# Patient Record
Sex: FEMALE | Race: WHITE | NOT HISPANIC OR LATINO | Employment: UNEMPLOYED | URBAN - METROPOLITAN AREA
[De-identification: names, ages, dates, MRNs, and addresses within clinical notes are randomized per-mention and may not be internally consistent; named-entity substitution may affect disease eponyms.]

---

## 2025-05-28 ENCOUNTER — APPOINTMENT (OUTPATIENT)
Dept: RADIOLOGY | Facility: HOSPITAL | Age: 40
End: 2025-05-28
Payer: COMMERCIAL

## 2025-05-28 ENCOUNTER — HOSPITAL ENCOUNTER (EMERGENCY)
Facility: HOSPITAL | Age: 40
Discharge: HOME | End: 2025-05-28
Payer: COMMERCIAL

## 2025-05-28 ENCOUNTER — OFFICE VISIT (OUTPATIENT)
Dept: URGENT CARE | Age: 40
End: 2025-05-28
Payer: COMMERCIAL

## 2025-05-28 ENCOUNTER — TELEPHONE (OUTPATIENT)
Dept: HEMATOLOGY/ONCOLOGY | Facility: CLINIC | Age: 40
End: 2025-05-28
Payer: COMMERCIAL

## 2025-05-28 VITALS
WEIGHT: 180 LBS | RESPIRATION RATE: 14 BRPM | OXYGEN SATURATION: 98 % | BODY MASS INDEX: 30.73 KG/M2 | HEART RATE: 86 BPM | SYSTOLIC BLOOD PRESSURE: 130 MMHG | DIASTOLIC BLOOD PRESSURE: 91 MMHG | HEIGHT: 64 IN | TEMPERATURE: 98.4 F

## 2025-05-28 VITALS
SYSTOLIC BLOOD PRESSURE: 122 MMHG | DIASTOLIC BLOOD PRESSURE: 80 MMHG | OXYGEN SATURATION: 98 % | HEART RATE: 101 BPM | TEMPERATURE: 96.3 F

## 2025-05-28 DIAGNOSIS — L08.9 FOOT INFECTION: Primary | ICD-10-CM

## 2025-05-28 DIAGNOSIS — L03.115 CELLULITIS OF RIGHT LOWER EXTREMITY: Primary | ICD-10-CM

## 2025-05-28 LAB
ALBUMIN SERPL BCP-MCNC: 3.8 G/DL (ref 3.4–5)
ALP SERPL-CCNC: 67 U/L (ref 33–110)
ALT SERPL W P-5'-P-CCNC: 25 U/L (ref 7–45)
ANION GAP SERPL CALC-SCNC: 9 MMOL/L (ref 10–20)
AST SERPL W P-5'-P-CCNC: 20 U/L (ref 9–39)
BASOPHILS # BLD AUTO: 0.06 X10*3/UL (ref 0–0.1)
BASOPHILS NFR BLD AUTO: 0.5 %
BILIRUB SERPL-MCNC: 0.3 MG/DL (ref 0–1.2)
BUN SERPL-MCNC: 12 MG/DL (ref 6–23)
CALCIUM SERPL-MCNC: 8.4 MG/DL (ref 8.6–10.3)
CHLORIDE SERPL-SCNC: 103 MMOL/L (ref 98–107)
CO2 SERPL-SCNC: 27 MMOL/L (ref 21–32)
CREAT SERPL-MCNC: 0.71 MG/DL (ref 0.5–1.05)
CRP SERPL-MCNC: 9.98 MG/DL
EGFRCR SERPLBLD CKD-EPI 2021: >90 ML/MIN/1.73M*2
EOSINOPHIL # BLD AUTO: 0.11 X10*3/UL (ref 0–0.7)
EOSINOPHIL NFR BLD AUTO: 1 %
ERYTHROCYTE [DISTWIDTH] IN BLOOD BY AUTOMATED COUNT: 19.2 % (ref 11.5–14.5)
ERYTHROCYTE [SEDIMENTATION RATE] IN BLOOD BY WESTERGREN METHOD: 66 MM/H (ref 0–20)
GLUCOSE SERPL-MCNC: 95 MG/DL (ref 74–99)
HCT VFR BLD AUTO: 33.4 % (ref 36–46)
HGB BLD-MCNC: 9.5 G/DL (ref 12–16)
IMM GRANULOCYTES # BLD AUTO: 0.03 X10*3/UL (ref 0–0.7)
IMM GRANULOCYTES NFR BLD AUTO: 0.3 % (ref 0–0.9)
LACTATE SERPL-SCNC: 0.7 MMOL/L (ref 0.4–2)
LYMPHOCYTES # BLD AUTO: 1.64 X10*3/UL (ref 1.2–4.8)
LYMPHOCYTES NFR BLD AUTO: 14.9 %
MCH RBC QN AUTO: 20.6 PG (ref 26–34)
MCHC RBC AUTO-ENTMCNC: 28.4 G/DL (ref 32–36)
MCV RBC AUTO: 73 FL (ref 80–100)
MONOCYTES # BLD AUTO: 0.93 X10*3/UL (ref 0.1–1)
MONOCYTES NFR BLD AUTO: 8.4 %
NEUTROPHILS # BLD AUTO: 8.24 X10*3/UL (ref 1.2–7.7)
NEUTROPHILS NFR BLD AUTO: 74.9 %
NRBC BLD-RTO: 0 /100 WBCS (ref 0–0)
PLATELET # BLD AUTO: 340 X10*3/UL (ref 150–450)
POTASSIUM SERPL-SCNC: 3.3 MMOL/L (ref 3.5–5.3)
PROT SERPL-MCNC: 6.8 G/DL (ref 6.4–8.2)
RBC # BLD AUTO: 4.61 X10*6/UL (ref 4–5.2)
SODIUM SERPL-SCNC: 136 MMOL/L (ref 136–145)
WBC # BLD AUTO: 11 X10*3/UL (ref 4.4–11.3)

## 2025-05-28 PROCEDURE — 96366 THER/PROPH/DIAG IV INF ADDON: CPT

## 2025-05-28 PROCEDURE — 1036F TOBACCO NON-USER: CPT

## 2025-05-28 PROCEDURE — 36415 COLL VENOUS BLD VENIPUNCTURE: CPT | Performed by: NURSE PRACTITIONER

## 2025-05-28 PROCEDURE — 93971 EXTREMITY STUDY: CPT

## 2025-05-28 PROCEDURE — 85652 RBC SED RATE AUTOMATED: CPT | Performed by: NURSE PRACTITIONER

## 2025-05-28 PROCEDURE — 87075 CULTR BACTERIA EXCEPT BLOOD: CPT | Mod: PORLAB | Performed by: NURSE PRACTITIONER

## 2025-05-28 PROCEDURE — 99203 OFFICE O/P NEW LOW 30 MIN: CPT

## 2025-05-28 PROCEDURE — 2500000005 HC RX 250 GENERAL PHARMACY W/O HCPCS: Performed by: NURSE PRACTITIONER

## 2025-05-28 PROCEDURE — 96365 THER/PROPH/DIAG IV INF INIT: CPT

## 2025-05-28 PROCEDURE — 83605 ASSAY OF LACTIC ACID: CPT | Performed by: NURSE PRACTITIONER

## 2025-05-28 PROCEDURE — 84075 ASSAY ALKALINE PHOSPHATASE: CPT | Performed by: NURSE PRACTITIONER

## 2025-05-28 PROCEDURE — 86140 C-REACTIVE PROTEIN: CPT | Performed by: NURSE PRACTITIONER

## 2025-05-28 PROCEDURE — 2500000004 HC RX 250 GENERAL PHARMACY W/ HCPCS (ALT 636 FOR OP/ED): Performed by: NURSE PRACTITIONER

## 2025-05-28 PROCEDURE — 99284 EMERGENCY DEPT VISIT MOD MDM: CPT | Mod: 25,27

## 2025-05-28 PROCEDURE — 96375 TX/PRO/DX INJ NEW DRUG ADDON: CPT

## 2025-05-28 PROCEDURE — 96367 TX/PROPH/DG ADDL SEQ IV INF: CPT

## 2025-05-28 PROCEDURE — 93971 EXTREMITY STUDY: CPT | Performed by: RADIOLOGY

## 2025-05-28 PROCEDURE — 85025 COMPLETE CBC W/AUTO DIFF WBC: CPT | Performed by: NURSE PRACTITIONER

## 2025-05-28 RX ORDER — METOCLOPRAMIDE HYDROCHLORIDE 5 MG/ML
10 INJECTION INTRAMUSCULAR; INTRAVENOUS ONCE
Status: COMPLETED | OUTPATIENT
Start: 2025-05-28 | End: 2025-05-28

## 2025-05-28 RX ORDER — CEPHALEXIN 500 MG/1
500 CAPSULE ORAL 4 TIMES DAILY
Qty: 40 CAPSULE | Refills: 0 | Status: SHIPPED | OUTPATIENT
Start: 2025-05-28 | End: 2025-06-11

## 2025-05-28 RX ORDER — VANCOMYCIN HYDROCHLORIDE 1 G/20ML
INJECTION, POWDER, LYOPHILIZED, FOR SOLUTION INTRAVENOUS DAILY PRN
Status: DISCONTINUED | OUTPATIENT
Start: 2025-05-28 | End: 2025-05-28 | Stop reason: HOSPADM

## 2025-05-28 RX ORDER — SULFAMETHOXAZOLE AND TRIMETHOPRIM 800; 160 MG/1; MG/1
1 TABLET ORAL 2 TIMES DAILY
Qty: 20 TABLET | Refills: 0 | Status: SHIPPED | OUTPATIENT
Start: 2025-05-28 | End: 2025-06-11

## 2025-05-28 RX ADMIN — METOCLOPRAMIDE 10 MG: 5 INJECTION, SOLUTION INTRAMUSCULAR; INTRAVENOUS at 12:25

## 2025-05-28 RX ADMIN — VANCOMYCIN HYDROCHLORIDE 2000 MG: 10 INJECTION, POWDER, LYOPHILIZED, FOR SOLUTION INTRAVENOUS at 12:25

## 2025-05-28 RX ADMIN — PIPERACILLIN SODIUM AND TAZOBACTAM SODIUM 3.38 G: 3; .375 INJECTION, SOLUTION INTRAVENOUS at 11:55

## 2025-05-28 ASSESSMENT — LIFESTYLE VARIABLES
EVER HAD A DRINK FIRST THING IN THE MORNING TO STEADY YOUR NERVES TO GET RID OF A HANGOVER: NO
HAVE YOU EVER FELT YOU SHOULD CUT DOWN ON YOUR DRINKING: NO
TOTAL SCORE: 0
HAVE PEOPLE ANNOYED YOU BY CRITICIZING YOUR DRINKING: NO
EVER FELT BAD OR GUILTY ABOUT YOUR DRINKING: NO

## 2025-05-28 ASSESSMENT — PAIN - FUNCTIONAL ASSESSMENT: PAIN_FUNCTIONAL_ASSESSMENT: 0-10

## 2025-05-28 ASSESSMENT — PAIN SCALES - GENERAL: PAINLEVEL_OUTOF10: 10 - WORST POSSIBLE PAIN

## 2025-05-28 ASSESSMENT — COLUMBIA-SUICIDE SEVERITY RATING SCALE - C-SSRS
1. IN THE PAST MONTH, HAVE YOU WISHED YOU WERE DEAD OR WISHED YOU COULD GO TO SLEEP AND NOT WAKE UP?: NO
2. HAVE YOU ACTUALLY HAD ANY THOUGHTS OF KILLING YOURSELF?: NO
6. HAVE YOU EVER DONE ANYTHING, STARTED TO DO ANYTHING, OR PREPARED TO DO ANYTHING TO END YOUR LIFE?: NO

## 2025-05-28 NOTE — TELEPHONE ENCOUNTER
Attempted to call patient, her only phone number listed is disconnected. Called patient's mother (emergency contact). Left VM on secure VM explaining that I am calling from [facility] trying to schedule an appt for her daughter but need updated contact info for her. [facility] phone number provided.

## 2025-05-28 NOTE — DISCHARGE INSTRUCTIONS
The [facility] will be contacting you for follow-up regarding your right knee mass.

## 2025-05-28 NOTE — PROGRESS NOTES
Subjective   Patient ID: Sayra Chau is a 40 y.o. female. They present today with a chief complaint of poss. pinky toe bite, knot behing knee (Rt foot is swollen, red, painful, different bites appearing. Pt is also complaining of a knot behind knee with pain).    History of Present Illness  Sridevi Chau is a 40 y.o. female. They present today with a chief complaint of poss. pinky toe bite, knot behing knee (Rt foot is swollen, red, painful, different bites appearing. Pt is also complaining of a knot behind knee with pain).  She states that she may have gotten bit on her right foot.  She noticed immediate swelling, body aches fevers and chills over the last 48 hours.  She states that her right lower leg feels warm and that she feels a knot in the back of her thigh.  She is here for evaluation.    Past Medical History  Allergies as of 05/28/2025 - Reviewed 05/28/2025   Allergen Reaction Noted    Penicillin g Hives 12/02/2020    Zofran odt [ondansetron] Nausea/vomiting 05/28/2025       Prescriptions Prior to Admission[1]     Medical History[2]    Surgical History[3]     reports that she has never smoked. She has never used smokeless tobacco.    Review of Systems  Review of Systems  Joint swelling, myalgia, arthralgia, erythema, bruising, fevers, chills      Objective    Vitals:    05/28/25 1023   BP: 122/80   Pulse: 101   Temp: 35.7 °C (96.3 °F)   SpO2: 98%     No LMP recorded.    Physical Exam  Musculoskeletal:        Feet:    Feet:      Comments: Erythema, swelling, bruising        Procedures    Point of Care Test & Imaging Results from this visit  No results found for this visit on 05/28/25.   Imaging  No results found.    Cardiology, Vascular, and Other Imaging  No other imaging results found for the past 2 days      Diagnostic study results (if any) were reviewed by KATARZYNA Rodriguez-KEITH.    Assessment/Plan   Allergies, medications, history, and pertinent labs/EKGs/Imaging reviewed by Harry Castaneda,  APRN-CNP.     Medical Decision Making  Right foot evaluation does reveal generalized swelling, erythema, warmth and tenderness that is starting to extend up her foot.  Given the purported events of a possible spider bite, fevers, body aches and chills, recommend the patient heads to [facility] emergency department for blood cultures, lab work, IV antibiotics.  Given the evaluation, I do not believe p.o. antibiotics are applicable at this time.    As a result of the work-up, the patient was discharged home.  she was informed of her diagnosis and instructed to come back with any concerns or worsening of condition.  she and was agreeable to the plan as discussed above.  she was given the opportunity to ask questions.  All of the patient's questions were answered.    This document was generated using the assistance of voice recognition software. If there are any errors of spelling, grammar, syntax, or meaning; please feel free to contact me directly for clarification.     Orders and Diagnoses  Diagnoses and all orders for this visit:  Foot infection      Medical Admin Record      Patient disposition: ED    Electronically signed by AUDELIA Rodriguez  10:41 AM           [1] (Not in a hospital admission)   [2] History reviewed. No pertinent past medical history.  [3] History reviewed. No pertinent surgical history.

## 2025-05-28 NOTE — TELEPHONE ENCOUNTER
Referral placed to [facility] by Anne Clarke NP, [facility], for right popliteal mass vs LN, discovered incidentally on RLE duplex imaging today. Advise VV for evaluation & work-up. Thanks,  KATARZYNA Laughlin.CNP  [facility]

## 2025-05-28 NOTE — ED PROVIDER NOTES
"    HPI   Chief Complaint   Patient presents with    Insect Bite     Pt felt something bite her right pinky toe on Sunday morning, pt noticed her foot started swelling yesterday, today pt foot is red and swollen and can't  put pressure on it. Pedal pulse +2, cap refill less then 2 sec, pt reports no sensory loss and pt has full range of motion       This is a 40-year-old  female that is presenting to the emergency room with concerns for an insect bite to the right foot.  The patient states that she is staying at a hotel and believes that a spider bit her right fifth toe 4 days ago.  The patient states that she noticed that it started swelling and turning red yesterday and is now not able to put weight onto it.  The patient states that she has been having mild cold-like symptoms.  She is concerned that the poison is traveling up to her heart.  She is not having any chest pain, shortness of breath, dizziness, palpitations, paresthesias, focal weakness.  Patient is not experiencing any abdominal pain or alteration in her urine or bowel function.  Denies any fever.  She states she has been mildly nauseous without any vomiting.  The patient also states that she had swelling and pain in the posterior aspect of the right knee. The patient denies any history of pulmonary embolism or DVT.  Patient is not experiencing any calf swelling or pain.  Denies any recent travel, hospitalizations, cancer, or surgery.  The patient was seen at the urgent care and referred to the emergency room for further evaluation.      History provided by:  Patient   used: No                          Edwards Coma Scale Score: 15                  Patient History   Medical History[1]  Surgical History[2]  Family History[3]  Social History[4]    Physical Exam   Visit Vitals  /84   Pulse (!) 113   Temp 36.9 °C (98.4 °F)   Resp 20   Ht 1.626 m (5' 4\")   Wt 81.6 kg (180 lb)   SpO2 99%   BMI 30.90 kg/m²   Smoking Status " Never   BSA 1.92 m²      Physical Exam    Lower extremity venous duplex right    (Results Pending)       Labs Reviewed   CBC WITH AUTO DIFFERENTIAL - Abnormal       Result Value    WBC 11.0      nRBC 0.0      RBC 4.61      Hemoglobin 9.5 (*)     Hematocrit 33.4 (*)     MCV 73 (*)     MCH 20.6 (*)     MCHC 28.4 (*)     RDW 19.2 (*)     Platelets 340      Neutrophils % 74.9      Immature Granulocytes %, Automated 0.3      Lymphocytes % 14.9      Monocytes % 8.4      Eosinophils % 1.0      Basophils % 0.5      Neutrophils Absolute 8.24 (*)     Immature Granulocytes Absolute, Automated 0.03      Lymphocytes Absolute 1.64      Monocytes Absolute 0.93      Eosinophils Absolute 0.11      Basophils Absolute 0.06     BLOOD CULTURE   BLOOD CULTURE   COMPREHENSIVE METABOLIC PANEL   LACTATE   C-REACTIVE PROTEIN   SEDIMENTATION RATE, AUTOMATED         ED Course & MDM   Diagnoses as of 05/28/25 1516   Cellulitis of right lower extremity           Medical Decision Making  The patient was seen and evaluated by the nurse practitioner, Anne Clarke.  The patient is presenting to the emergency room with complaints of a possible insect bite to the right foot.  She is also has complaints of a lump to the posterior aspect of her right knee.  Differential diagnosis includes cellulitis, osteomyelitis, DVT, Baker's cyst, mass, or other acute process.  Patient was placed on cardiac monitor and continuous pulse oximetry.  A saline lock was established and laboratory studies were drawn with results as noted.  2 sets of blood cultures were obtained and results are pending at the time of dictation the patient was administered vancomycin and Zosyn for antibiotic coverage.  The patient had complaints of nausea and was further medicated with Reglan 10 mg IVP.  The patient was able to tolerate fluids after medication administration.  Laboratory studies revealed that the patient had no evidence of a leukocytosis or lactic acidosis.  The patient did  have elevated inflammatory markers with a sed rate of 66 and a C-reactive protein of 9.98.  An ultrasound of the right lower extremity showed no evidence of venous thrombosis however there was a soft tissue mass with internal vascularity in the popliteal fossa.  The patient was notified of the imaging and laboratory results.  We believe that the patient most likely has cellulitis to the lower extremity.  He has no evidence of an insect bite.  No induration or fluctuance appreciated.  No lymphangitic streaking.  The patient will be provided prescriptions for Keflex and Bactrim for home administration.  She was referred to the [facility] for further evaluation of the popliteal mass.  The patient is to follow up with their primary care physician in the next 2-3 days.  The patient is to return to the ED worse in any way.  The patient was discharged in stable condition with computer discharge instructions given. Patient was agreeable with discharge planning.           Your medication list      You have not been prescribed any medications.         Procedure       *This report was transcribed using voice recognition software.  Every effort was made to ensure accuracy; however, inadvertent computerized transcription errors may be present.*  Anne COLÓN-CNP  05/28/25           [1] History reviewed. No pertinent past medical history.  [2] History reviewed. No pertinent surgical history.  [3] No family history on file.  [4]   Social History  Tobacco Use    Smoking status: Never    Smokeless tobacco: Never   Substance Use Topics    Alcohol use: Not on file    Drug use: Not on file        KATARZYNA Smart-KEITH  05/28/25 5115

## 2025-06-01 LAB
BACTERIA BLD CULT: NORMAL
BACTERIA BLD CULT: NORMAL

## 2025-06-02 ENCOUNTER — PATIENT OUTREACH (OUTPATIENT)
Dept: HEMATOLOGY/ONCOLOGY | Facility: HOSPITAL | Age: 40
End: 2025-06-02
Payer: COMMERCIAL

## 2025-06-02 NOTE — PROGRESS NOTES
Patient is scheduled for a new patient visit with Dr. Aida Hernandez on Wednesday 6/4/2025 at 10am.   Referred by AUDELIA Smart and AUDELIA Sanchez for Lymphocytosis and new right popliteal mass/cellulitis.     Patient had a BLE US completed on 5/28/2025, results are visible in EPIC.     Called and spoke to patient. Informed patient where to go, parking location, check-in process, and answered all other new patient visit questions that the patient had at this time.     Patient states that the mass on her leg is painful, burns when she eats certain foods, has had yellow-colored discharge from mass, and symptoms have been getting worse over the last couple months.     Confirmed that she will be at her appointment on 6/4/2025.   Call back number provided if needed or if patient has any further questions before her appointment.

## 2025-06-04 ENCOUNTER — OFFICE VISIT (OUTPATIENT)
Dept: HEMATOLOGY/ONCOLOGY | Facility: HOSPITAL | Age: 40
End: 2025-06-04
Payer: COMMERCIAL

## 2025-06-04 VITALS
SYSTOLIC BLOOD PRESSURE: 140 MMHG | RESPIRATION RATE: 14 BRPM | TEMPERATURE: 97 F | WEIGHT: 221.6 LBS | HEART RATE: 101 BPM | BODY MASS INDEX: 38.04 KG/M2 | OXYGEN SATURATION: 99 % | DIASTOLIC BLOOD PRESSURE: 81 MMHG

## 2025-06-04 DIAGNOSIS — L03.115 CELLULITIS OF RIGHT LOWER EXTREMITY: Primary | ICD-10-CM

## 2025-06-04 DIAGNOSIS — D64.9 ANEMIA, UNSPECIFIED TYPE: ICD-10-CM

## 2025-06-04 DIAGNOSIS — R22.41 MASS OF RIGHT LOWER EXTREMITY: ICD-10-CM

## 2025-06-04 DIAGNOSIS — F17.200 TOBACCO USE DISORDER: ICD-10-CM

## 2025-06-04 PROCEDURE — 1036F TOBACCO NON-USER: CPT | Performed by: NURSE PRACTITIONER

## 2025-06-04 PROCEDURE — 99205 OFFICE O/P NEW HI 60 MIN: CPT | Performed by: NURSE PRACTITIONER

## 2025-06-04 PROCEDURE — 99215 OFFICE O/P EST HI 40 MIN: CPT | Performed by: NURSE PRACTITIONER

## 2025-06-04 RX ORDER — DOXYCYCLINE 100 MG/1
100 TABLET ORAL 2 TIMES DAILY
Qty: 20 TABLET | Refills: 0 | Status: SHIPPED | OUTPATIENT
Start: 2025-06-04 | End: 2025-06-12 | Stop reason: WASHOUT

## 2025-06-04 ASSESSMENT — PAIN SCALES - GENERAL: PAINLEVEL_OUTOF10: 10-WORST PAIN EVER

## 2025-06-04 NOTE — PATIENT INSTRUCTIONS
Plan:    (1). Antibiotics for your foot infection: Doxycycline one pill by mouth twice daily with food. Complete the whole prescription.    (2). Rest and elevate your foot up on pillows throughout the day. Apply heating pad on low or warm compress to your leg. Use Ibuprofen as needed to help with inflammation and discomfort. Follow dosing on bottle and take with food.    (3). Referral to wound clinic for your foot.    (4). Referral to primary care to get established with a doctor - for follow-up of your cellulitis, evaluation of your anemia, and other symptoms and routine health maintenance.    (5). Recheck ultrasound of your right leg in about a month, once cellulitis is improving.    Call us with any questions or issues.  [phone]  Jocelyne Zepeda, APRN-CNP

## 2025-06-04 NOTE — PROGRESS NOTES
Patient arrived to visit today but is being seen my diagnostic clinic instead as a new patient. Jocelyne Zepeda NP is assuming care at this time.     NO further needs from Dr. Hernandez's team at this time.

## 2025-06-04 NOTE — PROGRESS NOTES
"[facility]  Diagnostic Clinic  New Patient Visit    Date of Service: 25      Patient Name: Sayra Chau   : 1985  MRN: [id number]   PCP: No primary care provider on file.   Referred by: KATARZYNA Sousa    Problem: popliteal mass    HPI: Sayra Chau is a 40 y.o. year old female w/ PMH nephrolithiasis, GERD, tobacco use disorder, neuropathy, who presents to [facility] with right popliteal mass, referral placed by Anne Clarke NP, [facility] ED.     Ms. Chau initially presented to [place] ED on 25 with concern for RLE cellulitis. She noted an insect bite on her 5th toe a few days prior, which led to worsening redness, swelling, and discomfort of her RLE. She underwent RLE duplex US - which was negative for DVT but incidentally revealed a hypoechoic mass with internal vascularity measuring 14 x 13 x 13 mm of the right popliteal fossa, possibly a lymph node. She was discharged home with courses of Keflex & Bactrim for RLE cellulitis & advised to follow-up with the diagnostic clinic for evaluation of the popliteal mass.    Ms. Chau presents to clinic today & reports only mild improvement in her right foot redness, swelling, and discomfort. States she discontinued both antibiotics after ~3 days due to feeling poorly and flushed. She reports her RLE symptoms started approx 10 days ago, after noticing an insect bite her right fifth toe. Redness spread up her foot to ankle, now notes mild peeling over foot and mild swelling. She has baseline neuropathy and describes paresthesias of bilateral feet. Endorses fevers/chills and vomiting at outset of symptoms, now resolved. Also states she noted a \"lump\" behind her right knee with onset of cellulitis 10 days ago, describes as tender, possibly decreasing over past few days.     History of tobacco use:   Current smoker, started age 23, 1 ppd.  17 pack yr history.    Personal history of malignancy:   None.    PATHOLOGY:  N/A   "   IMAGING:    === 05/28/25 ===    RLE DUPLEX ULTRASOUND    FINDINGS:  THIGH VEINS:  The common femoral, femoral, popliteal, proximal medial  saphenous, and deep femoral veins are patent and free of thrombus.  The veins are normally compressible.  They demonstrate normal phasic  flow and augmentation response.      CALF VEINS:  The paired peroneal and posterior tibial calf veins are  patent.      In the lateral aspect of the popliteal fossa there is a hypoechoic  mass with internal vascularity measuring 14 x 13 x 13 mm. Neoplastic  lesion is a consideration possibly an enlarged popliteal lymph node.  No fat hilum is seen. Of note, there are few enlarged right inguinal  lymph nodes however they have a more benign morphology with central  fatty judy.      IMPRESSION:  Negative study.  No deep venous thrombosis of the  right lower  extremity.      Small soft tissue mass with internal vascularity in the popliteal  fossa as detailed above.      MACRO:  None      Signed by: Joseph Schoenberger 5/28/2025 1:36 PM  Dictation workstation:   [id number]      LABS:  5/28/25 ED lab work reviewed, anemia noted, present for >2 years, no prior work-up. Inflammatory markers (ESR, CRP) elevated. BCs negative.    PAST MEDICAL HISTORY:  Medical History[1]    PAST SURGICAL HISTORY:  Surgical History[2]    SOCIAL HISTORY:  Social Connections: Not on file       FAMILY HISTORY:  Family History[3]    MEDICATIONS:  Medications Ordered Prior to Encounter[4]      REVIEW OF SYSTEMS:  Review of Systems   Constitutional:  Positive for chills, fever and unexpected weight change.   HENT:   Negative for trouble swallowing.    Respiratory:  Negative for cough.    Gastrointestinal:  Positive for nausea. Negative for vomiting.   Endocrine:        Denies night sweats   Musculoskeletal:  Positive for arthralgias.        +chronic LUE pain, radiating from wrist up to shoulder; +right foot pain x 10 days   Skin:  Positive for wound.        +10 day course of  right foot redness, swelling, warmth, see HPI   Neurological:         +neuropathy x bilat feet, +chronic pain, unable to work dt pain   Psychiatric/Behavioral:  The patient is nervous/anxious.         +surrounding current health issue         OBJECTIVE:  /81 (BP Location: Right arm, Patient Position: Sitting, BP Cuff Size: Large adult)   Pulse 101   Temp 36.1 °C (97 °F) (Skin)   Resp 14   Wt 101 kg (221 lb 9.6 oz) Comment: notfied nurse about weight did pt wieght twice  SpO2 99%   BMI 38.04 kg/m²   Physical Exam  Constitutional:       General: She is not in acute distress.     Appearance: Normal appearance. She is not toxic-appearing.      Comments: Tearful at times   HENT:      Head: Normocephalic.      Right Ear: External ear normal.      Left Ear: External ear normal.      Nose: Nose normal.      Mouth/Throat:      Mouth: Mucous membranes are moist.      Pharynx: Oropharynx is clear. No oropharyngeal exudate or posterior oropharyngeal erythema.   Eyes:      Extraocular Movements: Extraocular movements intact.      Conjunctiva/sclera: Conjunctivae normal.   Cardiovascular:      Rate and Rhythm: Normal rate and regular rhythm.      Pulses: Normal pulses.      Heart sounds: Normal heart sounds. No murmur heard.     No gallop.   Pulmonary:      Effort: No respiratory distress.      Breath sounds: Normal breath sounds. No wheezing or rales.   Abdominal:      General: Bowel sounds are normal.      Palpations: Abdomen is soft.      Tenderness: There is no abdominal tenderness. There is no guarding.   Musculoskeletal:      Cervical back: Neck supple.      Right lower leg: Edema present.      Left lower leg: No edema.      Comments: 1+ right pedal edema   Lymphadenopathy:      Cervical: No cervical adenopathy.   Skin:     Findings: Erythema present.      Comments: +right foot mildly erythematous diffusely to ankle, desquamating, mild swelling, pedal pulse intact, full ROM.   Neurological:      General: No  focal deficit present.      Mental Status: She is alert and oriented to person, place, and time.       ASSESSMENT:  Sayra Chau is a 40 y.o. year old female w/ PMH nephrolithiasis, GERD, tobacco use disorder, neuropathy, who presents to [facility] with right popliteal mass of uncertain etiology.    PLAN:  1. Cellulitis of right lower extremity (Primary)  2. Mass of right lower extremity  3. Tobacco use disorder  4. Anemia  Uncertain etiology of popliteal mass, favor enlarged lymph node in setting of RLE cellulitis.  -- Antibiotics discontinued by patient & unwilling to trial again. Doxycycline Rx sent to her preferred pharmacy, states she has tolerated in past, advised to take entire course.   -- Encourage elevation of RLE as possible, anti-inflammatory PRN.  -- Advised appt to establish w/ PCP for re-evaluation of RLE cellulitis. Referral placed; we will assist w/ making an appt today.  -- Also discussed pt will need anemia work-up w/ PCP, as persistent over >2 yrs. Also has chronic abd pain, nausea, diarrhea, so will need PCP to coordinate gastro referral.  -- RLE ultrasound ordered to re-evaluate popliteal mass in 1 month, following treatment of cellulitis.  -- Strongly encourage tobacco cessation.  -- Follow-up pending results of above; Diagnostic Clinic to follow results & contact patient for review.  -- All patient questions answered. I provided the patient w/ the contact information for the diagnostic clinic; advised her to call in the interim with any questions/issues.     KATARZYNA Laughlin.CNP  [facility]                  [1]  History reviewed. No pertinent past medical history.  [2]  History reviewed. No pertinent surgical history.  [3]  No family history on file.  [4]  Current Outpatient Medications on File Prior to Visit   Medication Sig Dispense Refill   • cephalexin (Keflex) 500 mg capsule Take 1 capsule (500 mg) by mouth 4 times a day for 10 days. 40 capsule 0   •  sulfamethoxazole-trimethoprim (Bactrim DS) 800-160 mg tablet Take 1 tablet by mouth 2 times a day for 10 days. 20 tablet 0     No current facility-administered medications on file prior to visit.

## 2025-06-11 ASSESSMENT — ENCOUNTER SYMPTOMS
ENDOCRINE COMMENTS: DENIES NIGHT SWEATS
COUGH: 0
TROUBLE SWALLOWING: 0
ARTHRALGIAS: 1
NAUSEA: 1
WOUND: 1
VOMITING: 0
CHILLS: 1
NERVOUS/ANXIOUS: 1
FEVER: 1
UNEXPECTED WEIGHT CHANGE: 1

## 2025-06-12 ENCOUNTER — APPOINTMENT (OUTPATIENT)
Dept: PRIMARY CARE | Facility: CLINIC | Age: 40
End: 2025-06-12
Payer: COMMERCIAL

## 2025-06-12 VITALS
TEMPERATURE: 97.4 F | SYSTOLIC BLOOD PRESSURE: 99 MMHG | DIASTOLIC BLOOD PRESSURE: 67 MMHG | WEIGHT: 221 LBS | HEIGHT: 64 IN | BODY MASS INDEX: 37.73 KG/M2 | OXYGEN SATURATION: 99 % | HEART RATE: 65 BPM | RESPIRATION RATE: 18 BRPM

## 2025-06-12 DIAGNOSIS — R19.7 DIARRHEA, UNSPECIFIED TYPE: ICD-10-CM

## 2025-06-12 DIAGNOSIS — R20.0 NUMBNESS AND TINGLING OF BOTH FEET: ICD-10-CM

## 2025-06-12 DIAGNOSIS — G89.29 CHRONIC PAIN OF BOTH WRISTS: ICD-10-CM

## 2025-06-12 DIAGNOSIS — L03.115 CELLULITIS OF RIGHT LOWER EXTREMITY: ICD-10-CM

## 2025-06-12 DIAGNOSIS — N92.6 IRREGULAR MENSTRUATION, UNSPECIFIED: ICD-10-CM

## 2025-06-12 DIAGNOSIS — M25.531 CHRONIC PAIN OF BOTH WRISTS: ICD-10-CM

## 2025-06-12 DIAGNOSIS — Z87.898 HISTORY OF NUMBNESS: ICD-10-CM

## 2025-06-12 DIAGNOSIS — R10.84 GENERALIZED ABDOMINAL PAIN: ICD-10-CM

## 2025-06-12 DIAGNOSIS — M25.532 CHRONIC PAIN OF BOTH WRISTS: ICD-10-CM

## 2025-06-12 DIAGNOSIS — D64.9 ANEMIA, UNSPECIFIED TYPE: ICD-10-CM

## 2025-06-12 DIAGNOSIS — R20.2 NUMBNESS AND TINGLING OF BOTH FEET: ICD-10-CM

## 2025-06-12 DIAGNOSIS — Z76.89 ENCOUNTER TO ESTABLISH CARE WITH NEW DOCTOR: Primary | ICD-10-CM

## 2025-06-12 PROBLEM — E66.9 OBESITY: Status: ACTIVE | Noted: 2025-06-12

## 2025-06-12 PROBLEM — K02.9 PAIN DUE TO DENTAL CARIES: Status: ACTIVE | Noted: 2020-12-02

## 2025-06-12 PROBLEM — O28.3 ABNORMAL ULTRASONIC FINDING ON ANTENATAL SCREENING OF MOTHER: Status: ACTIVE | Noted: 2020-11-25

## 2025-06-12 PROCEDURE — 99204 OFFICE O/P NEW MOD 45 MIN: CPT | Performed by: STUDENT IN AN ORGANIZED HEALTH CARE EDUCATION/TRAINING PROGRAM

## 2025-06-12 PROCEDURE — 4004F PT TOBACCO SCREEN RCVD TLK: CPT | Performed by: STUDENT IN AN ORGANIZED HEALTH CARE EDUCATION/TRAINING PROGRAM

## 2025-06-12 PROCEDURE — 3008F BODY MASS INDEX DOCD: CPT | Performed by: STUDENT IN AN ORGANIZED HEALTH CARE EDUCATION/TRAINING PROGRAM

## 2025-06-12 SDOH — ECONOMIC STABILITY: FOOD INSECURITY: WITHIN THE PAST 12 MONTHS, THE FOOD YOU BOUGHT JUST DIDN'T LAST AND YOU DIDN'T HAVE MONEY TO GET MORE.: NEVER TRUE

## 2025-06-12 SDOH — ECONOMIC STABILITY: FOOD INSECURITY: WITHIN THE PAST 12 MONTHS, YOU WORRIED THAT YOUR FOOD WOULD RUN OUT BEFORE YOU GOT MONEY TO BUY MORE.: NEVER TRUE

## 2025-06-12 ASSESSMENT — ENCOUNTER SYMPTOMS
DIARRHEA: 0
CONFUSION: 0
FEVER: 0
VOMITING: 0
SHORTNESS OF BREATH: 0
HEADACHES: 0
COUGH: 0
NAUSEA: 0
ARTHRALGIAS: 1
UNEXPECTED WEIGHT CHANGE: 0
NUMBNESS: 1
CHILLS: 0
COLOR CHANGE: 0
DIZZINESS: 0
ABDOMINAL PAIN: 0
WHEEZING: 0
FATIGUE: 1
CONSTIPATION: 0
PALPITATIONS: 0

## 2025-06-12 ASSESSMENT — PATIENT HEALTH QUESTIONNAIRE - PHQ9
1. LITTLE INTEREST OR PLEASURE IN DOING THINGS: NOT AT ALL
2. FEELING DOWN, DEPRESSED OR HOPELESS: NOT AT ALL
SUM OF ALL RESPONSES TO PHQ9 QUESTIONS 1 & 2: 0

## 2025-06-12 ASSESSMENT — LIFESTYLE VARIABLES
HOW OFTEN DO YOU HAVE A DRINK CONTAINING ALCOHOL: NEVER
HOW MANY STANDARD DRINKS CONTAINING ALCOHOL DO YOU HAVE ON A TYPICAL DAY: PATIENT DOES NOT DRINK
HOW OFTEN DO YOU HAVE SIX OR MORE DRINKS ON ONE OCCASION: NEVER
AUDIT-C TOTAL SCORE: 0
SKIP TO QUESTIONS 9-10: 1

## 2025-06-12 NOTE — PROGRESS NOTES
Subjective   Patient ID: Sayra Chau is a 40 y.o. female who presents for New Patient Visit (Lump behind patient's knee, along with neuropathy in her feet).    HPI   She is a new pt here to [facility] care and also with few concerns. Reports having L wrist pain x 5-6 yrs and its getting worse; reports pain radiate to her shoulder. Also reports some R wrist pain with rain. Denies fall/trauma, swelling or bruise.   Also reports numbness & tingling sensation on her bl feet x for long time; worse after being on feet for longer time; also reports LE swelling, R > L leg. Also reports R knee pain along with lump on the posterior knee; lump intermittently painful. Also feels some pressure on the back of the knee. She is already following with heme/onc for posterior lump and is going for repeat US in a mo per chart.   Also reports intermittent nausea, abd pain/bloating and loose stool x last few yrs. Never saw gastro team before. Also smokes 1/2 ppd x 12-15 yrs.   Has anemia with recent Hgb 9.5 (005/28/25) and has been same in the last 2 yrs; pt reports intermittent heavy and painful period. She tried depo in the past, stopped as she was gaining too much wt on it. She was seeing GYN but he moved to diff location, not following with any at the moment. Reports she is not sex active in the last 3 yrs.   Reports she used to see psych in the past and was on multiple meds; per pt was told she had h/o anxiety, manic depression; reports trying diff meds as lithium, seroquel, trazodone and others w/o much help.     Review of Systems   Constitutional:  Positive for fatigue. Negative for chills, fever and unexpected weight change.   HENT: Negative.     Respiratory:  Negative for cough, shortness of breath and wheezing.    Cardiovascular:  Negative for chest pain, palpitations and leg swelling.   Gastrointestinal:  Negative for abdominal pain, constipation, diarrhea, nausea and vomiting.   Musculoskeletal:  Positive for arthralgias.   Skin:   "Negative for color change and rash.   Neurological:  Positive for numbness. Negative for dizziness and headaches.   Psychiatric/Behavioral:  Negative for behavioral problems and confusion.        Objective   BP 99/67 (BP Location: Left arm, Patient Position: Sitting, BP Cuff Size: Adult)   Pulse 65   Temp 36.3 °C (97.4 °F) (Temporal)   Resp 18   Ht 1.626 m (5' 4\")   Wt 100 kg (221 lb)   SpO2 99%   BMI 37.93 kg/m²     Physical Exam  Vitals and nursing note reviewed.   Constitutional:       Appearance: Normal appearance. She is obese.   Cardiovascular:      Rate and Rhythm: Normal rate and regular rhythm.      Pulses: Normal pulses.      Heart sounds: Normal heart sounds.   Pulmonary:      Effort: Pulmonary effort is normal.      Breath sounds: Normal breath sounds.   Abdominal:      General: Abdomen is flat. Bowel sounds are normal.      Palpations: Abdomen is soft.      Tenderness: There is no abdominal tenderness. There is no guarding or rebound.      Hernia: No hernia is present.      Comments: Mild ttp across the abd.    Musculoskeletal:         General: Normal range of motion.   Neurological:      General: No focal deficit present.      Mental Status: She is alert.   Psychiatric:         Mood and Affect: Mood normal.         Behavior: Behavior normal.       Assessment/Plan   She is a new pt here to [facility] care and also with few concerns.   Appears having L wrist pain for long time with worsening of sx recently, could be CTS vs OA; we will put referral to ortho as its going for so long for further eval & mgmt. R wrist could be same too. For now, icing few x daily; may add IBU/tylenol prn. May try using wrist splint nightly.     Unclear etiologies of bl feet numbness/tingling; could be metabolic as b12 def vs other. Added b12 & other bld work to eval further.   R knee pain and lump on posterior side could be cyst vs other; follow up with Heme as schd and get repeat US as ordered by them.   Also noted h/o " anemia for over 2 yrs, likely 2/2 menorrhagia; added iron panel. Also adv to d/w heme at NOV. Okay to start iron tab once daily. Take stool softener as needed. Enc hydration and fibers rich food. Referral to GYN placed for eval of menorrhagia. Having nausea and abd discomfort, referral to GI placed; for eval & possible scopes.   Highly rec to see psych as needed; stable mood currently. She is otherwise clinically & vitally stable.       Problem List Items Addressed This Visit    None  Visit Diagnoses         Codes      Encounter to establish care with new doctor    -  Primary Z76.89      Cellulitis of right lower extremity     L03.115      Generalized abdominal pain     R10.84    Relevant Orders    Referral to Gastroenterology      Diarrhea, unspecified type     R19.7    Relevant Orders    Referral to Gastroenterology      History of numbness     Z87.898      Numbness and tingling of both feet     R20.0, R20.2    Relevant Orders    Vitamin B12    Tsh With Reflex To Free T4 If Abnormal      Anemia, unspecified type     D64.9    Relevant Orders    Ferritin    Iron and TIBC      Chronic pain of both wrists     M25.531, M25.532, G89.29    Relevant Orders    Referral to Orthopedics and Sports Medicine      Irregular menstruation, unspecified     N92.6    Relevant Orders    Referral to Gynecology          Rtc 2-3 mo for FU    This note was partially generated using the Dragon Voice recognition system. There may be some incorrect wording, spelling and/or spelling errors or punctuation errors that were not corrected prior to committing the note to the medical record.      Jimbo Gaspar MD   [facility], Family Medicine

## 2025-06-13 LAB
FERRITIN SERPL-MCNC: 4 NG/ML (ref 16–154)
IRON SATN MFR SERPL: 5 % (CALC) (ref 16–45)
IRON SERPL-MCNC: 21 MCG/DL (ref 40–190)
TIBC SERPL-MCNC: 396 MCG/DL (CALC) (ref 250–450)
TSH SERPL-ACNC: 2.85 MIU/L
VIT B12 SERPL-MCNC: 282 PG/ML (ref 200–1100)

## 2025-06-16 PROBLEM — D64.9 ANEMIA: Status: ACTIVE | Noted: 2025-06-16

## 2025-06-18 DIAGNOSIS — G56.03 BILATERAL CARPAL TUNNEL SYNDROME: ICD-10-CM

## 2025-06-23 RX ORDER — HEPARIN 100 UNIT/ML
500 SYRINGE INTRAVENOUS AS NEEDED
OUTPATIENT
Start: 2025-06-24

## 2025-06-23 RX ORDER — HEPARIN SODIUM,PORCINE/PF 10 UNIT/ML
50 SYRINGE (ML) INTRAVENOUS AS NEEDED
OUTPATIENT
Start: 2025-06-24

## 2025-06-24 ENCOUNTER — INFUSION (OUTPATIENT)
Dept: INFUSION THERAPY | Facility: HOSPITAL | Age: 40
End: 2025-06-24
Payer: COMMERCIAL

## 2025-06-24 VITALS
SYSTOLIC BLOOD PRESSURE: 116 MMHG | OXYGEN SATURATION: 97 % | TEMPERATURE: 97.8 F | HEART RATE: 81 BPM | DIASTOLIC BLOOD PRESSURE: 79 MMHG | RESPIRATION RATE: 20 BRPM

## 2025-06-24 DIAGNOSIS — D64.9 ANEMIA, UNSPECIFIED TYPE: ICD-10-CM

## 2025-06-24 PROCEDURE — 2500000004 HC RX 250 GENERAL PHARMACY W/ HCPCS (ALT 636 FOR OP/ED): Performed by: STUDENT IN AN ORGANIZED HEALTH CARE EDUCATION/TRAINING PROGRAM

## 2025-06-24 PROCEDURE — 96365 THER/PROPH/DIAG IV INF INIT: CPT | Mod: INF

## 2025-06-24 RX ORDER — DIPHENHYDRAMINE HYDROCHLORIDE 50 MG/ML
50 INJECTION, SOLUTION INTRAMUSCULAR; INTRAVENOUS AS NEEDED
OUTPATIENT
Start: 2025-07-01

## 2025-06-24 RX ORDER — EPINEPHRINE 0.3 MG/.3ML
0.3 INJECTION SUBCUTANEOUS EVERY 5 MIN PRN
OUTPATIENT
Start: 2025-07-01

## 2025-06-24 RX ORDER — FAMOTIDINE 10 MG/ML
20 INJECTION, SOLUTION INTRAVENOUS ONCE AS NEEDED
OUTPATIENT
Start: 2025-07-01

## 2025-06-24 RX ORDER — ALBUTEROL SULFATE 0.83 MG/ML
3 SOLUTION RESPIRATORY (INHALATION) AS NEEDED
OUTPATIENT
Start: 2025-07-01

## 2025-06-24 RX ADMIN — FERUMOXYTOL 510 MG: 510 INJECTION INTRAVENOUS at 13:35

## 2025-06-24 ASSESSMENT — ENCOUNTER SYMPTOMS
OCCASIONAL FEELINGS OF UNSTEADINESS: 1
DEPRESSION: 0
LOSS OF SENSATION IN FEET: 0

## 2025-08-06 ENCOUNTER — APPOINTMENT (OUTPATIENT)
Facility: CLINIC | Age: 40
End: 2025-08-06
Payer: COMMERCIAL

## 2025-08-28 ENCOUNTER — APPOINTMENT (OUTPATIENT)
Dept: PRIMARY CARE | Facility: CLINIC | Age: 40
End: 2025-08-28
Payer: COMMERCIAL